# Patient Record
Sex: MALE | Race: BLACK OR AFRICAN AMERICAN | Employment: UNEMPLOYED | URBAN - METROPOLITAN AREA
[De-identification: names, ages, dates, MRNs, and addresses within clinical notes are randomized per-mention and may not be internally consistent; named-entity substitution may affect disease eponyms.]

---
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
## 2018-01-22 DIAGNOSIS — J45.909 ASTHMA: ICD-10-CM

## 2018-01-22 DIAGNOSIS — G47.33 OSA (OBSTRUCTIVE SLEEP APNEA): ICD-10-CM

## 2022-03-25 NOTE — TELEPHONE ENCOUNTER
Letter generated. Please let pt know.
Patient was in yesterday, is requesting a work/school notes to be excused from 1/9/2017 and return to work/school on 1/16/17
Spoke with patient, confirmed name and . Advised patient that letter is ready for , per Dr. Arabella Dominguez. Patient verbalized understanding.      Be advised
Patient